# Patient Record
Sex: MALE
[De-identification: names, ages, dates, MRNs, and addresses within clinical notes are randomized per-mention and may not be internally consistent; named-entity substitution may affect disease eponyms.]

---

## 2019-01-09 PROBLEM — Z00.00 ENCOUNTER FOR PREVENTIVE HEALTH EXAMINATION: Status: ACTIVE | Noted: 2019-01-09

## 2019-01-10 ENCOUNTER — FORM ENCOUNTER (OUTPATIENT)
Age: 29
End: 2019-01-10

## 2019-01-11 ENCOUNTER — APPOINTMENT (OUTPATIENT)
Dept: ORTHOPEDIC SURGERY | Facility: CLINIC | Age: 29
End: 2019-01-11
Payer: COMMERCIAL

## 2019-01-11 ENCOUNTER — APPOINTMENT (OUTPATIENT)
Dept: RADIOLOGY | Facility: CLINIC | Age: 29
End: 2019-01-11

## 2019-01-11 ENCOUNTER — OUTPATIENT (OUTPATIENT)
Dept: OUTPATIENT SERVICES | Facility: HOSPITAL | Age: 29
LOS: 1 days | End: 2019-01-11
Payer: COMMERCIAL

## 2019-01-11 VITALS — WEIGHT: 160 LBS | BODY MASS INDEX: 22.9 KG/M2 | HEIGHT: 70 IN | RESPIRATION RATE: 16 BRPM

## 2019-01-11 DIAGNOSIS — S76.311A STRAIN OF MUSCLE, FASCIA AND TENDON OF THE POSTERIOR MUSCLE GROUP AT THIGH LEVEL, RIGHT THIGH, INITIAL ENCOUNTER: ICD-10-CM

## 2019-01-11 PROCEDURE — 72190 X-RAY EXAM OF PELVIS: CPT | Mod: 26

## 2019-01-11 PROCEDURE — 73522 X-RAY EXAM HIPS BI 3-4 VIEWS: CPT

## 2019-01-11 PROCEDURE — 72190 X-RAY EXAM OF PELVIS: CPT

## 2019-01-11 PROCEDURE — 99204 OFFICE O/P NEW MOD 45 MIN: CPT

## 2019-01-11 NOTE — HISTORY OF PRESENT ILLNESS
[de-identified] : Referring Provider: Ins provider\par Chief Complaint: right hamstring pain\par Date of Injury/onset: 6-15-18, after running half marathon\par \par Summary of symptoms: right proximal hamstring pain\par Severity of Pain:  3/10\par Character of Pain: localized and achy at the right hamstring origin, occasionally shooting pain down lateral aspect of thigh.\par What and when makes pain worse: running , bending, prolonged sitting\par Associated symptoms: none\par Alleviating factors: NSAIDS, rest and foam roller\par \par Mr. TARA ELIZONDO is a very pleasant 28 year male who presents today for evaluation of right hamstring pain. He states that in June 2018 after running a half marathon he felt a locking sensation in his right IT band and hamstrings and had difficulty walking due to pain afterwards. Since then he has had continuous achy pain at the right proximal hamstring with any exercise including running or basketball. Patient plays basketball once per week and states the pain sets in a few hours after basketball. He has tried foam rolling at home and utilizes a lacrosse ball as well and has also done self directed physical therapy for several months. This has not provided him any relief and the pain is worsening, and he has not been able to run more than a mile for several months now. He denies previous history of injury.

## 2019-01-11 NOTE — PHYSICAL EXAM
[de-identified] : General: Patient is an athletic 28 year old male. He is awake and alert, demonstrates appropriate mood and affect, exhibits normal breathing and is in no acute distress.\par Psych: The patient is appropriately dressed and groomed, maintains good eye contact. Alert and oriented x 3. Normal attention/concentration, fund of knowledge and recall. Normal speech rate and rhythm. No hallucinations, suicidal or homicidal ideations. Demonstrates expected level of insight and judgment regarding health.\par Skin: The patient has no chronic skin lesions, rashes, or ulcers. There is no induration or erythema of uninvolved extremities. For skin exam of involved extremity refer to detailed musculoskeletal/extremity exam. \par Lymph: No cervical, axillary, or popliteal lymphadenopathy. There is no swelling or lymphedema in uninvolved extremities, refer to detailed exam for involved limbs.\par Cardiovascular: No visible jugular venous distention. Normal point of maximal impulse without thrill. There is brisk capillary refill in the digits of the affected extremity. They are symmetric pulses in the bilateral upper and lower extremities. \par Respiratory: The patient is in no apparent respiratory distress. They're taking full deep breaths wirh normal excursion, without use of accessory muscles or evidence of audible wheezes or stridor without the use of a stethoscope. \par Neurological: 5/5 motor strength and sensation intact throughout uninvolved upper and loweer extremities, refer to detailed musculoskeletal exam regarding involved extremity.\par Neck: Full range of motion with flexon, extention, rotation, and side bending; no palpable crepitus, normal alignment and lordosis, symmetric appearance, midline trachea, no thyroid hypertrophy or nodules\par Musculoskeletal: [normal gait]. good posture. normal clinical alignment of the spine. full range of motion in [bilateral] upper and [bilateral] lower extremities.\par \par [Right] Hip exam:\par Inspection:  Skin exam, Evaluation of Trendellenberg sign and standing pelvic obliquity, evaluation for antalgic or Trendellenberg gait\par Palpation:  Evaluation for tenderness at the pubic symphysis, in the inguinal crease, along the psoas tendon, at the abductor tendon insertion and trochanteric bursa, posteriorly along the external rotators, along the ischial tuberosity and at the SI joint\par Strength testing:  Hip flexion, abduction, extention and adduction with specific assesment for pain with resisted hip flexion\par Special tests:  JANETH, FADIR, Char, Impingement test, Stinchfield\par Concomitant L-spine exam performed with paraspinal, central, and SI joint palpation.  Evaluation of lumbar lordosis, seated and supine straight leg raise, slump test, sensory exam, and strength testing L2-S1\par \par Siginificant positive findings were as follows, with all other findings within normal limits:\par Range of Motion: Range of motion within normal limits and symmetric bilaterally\par Focal examination of the right hamstrings demonstrates tenderness to palpation of origin of biceps femoris and palpation here demonstrates a less robust tendon as compared to the contralateral side, however tendon is intact to palpation. No visible defect noted as compared to contralateral side. Trace weakness with resisted knee flexion on the right side as compared to the left side. Sensation intact in all distributions. Patient has full pain free range of motion of the right hip as compared to the left hip. [de-identified] : X-rays:\par AP and multiple frog-leg lateral views of bilateral hips were obtained and reviewed. No significant anomalies noted at the ischial tuberosity. Incidental finding of herniation present the anterior femoral neck and the left hip. Some findings consistent with possible sub-spine impingement

## 2019-01-11 NOTE — DISCUSSION/SUMMARY
[de-identified] : Based on today's visit the patient has been diagnosed with partial tear at the hamstring origin, likely biceps femoris.  Patient has attempted appropriate initial conservative treatment without improvement in his symptoms. He has findings consistent with a partial hamstring origin tear which requires relatively timely evaluation and treatment. Based on the length of symptoms and severity as well as his limitations, I would like to obtain an MRI to evaluate the extent of the tear. Additional Physical therapy will be deferred until MRI is obtained. We also discussed other options including corticosteroid injection, PRP injection, and arthroscopy, however we will wait for MRI to proceed.\par \par The patient's history, physical exam and any relevant studies were reviewed with the patient at length. We reviewed relevant additional diagnostic studies and treatment options including no intervention, activity modification techniques, available pharmaceuticals, therapy, durable medical equipment/bracing, and surgical interventions if applicable. The risks and benefits of all treatments were reviewed, including the potential morbidity of untreated pathology.\par \par Following discussion and shared decision-making, the patient has elected to proceed with conservative management.\par \par Planned Interventions: activity modification, NSAIDs\par \par Referrals: none\par \par Additional Studies: right hamstring MRI\par \par Followup: 6 weeks as needed for reevaluation, or earlier if symptoms worsen or fail to improve\par \par X-rays at followup: [None]

## 2019-03-05 ENCOUNTER — FORM ENCOUNTER (OUTPATIENT)
Age: 29
End: 2019-03-05

## 2019-03-06 ENCOUNTER — OUTPATIENT (OUTPATIENT)
Dept: OUTPATIENT SERVICES | Facility: HOSPITAL | Age: 29
LOS: 1 days | End: 2019-03-06

## 2019-03-06 ENCOUNTER — APPOINTMENT (OUTPATIENT)
Dept: MRI IMAGING | Facility: CLINIC | Age: 29
End: 2019-03-06
Payer: COMMERCIAL

## 2019-03-06 PROCEDURE — 72195 MRI PELVIS W/O DYE: CPT | Mod: 26

## 2019-03-11 PROBLEM — M76.899 HAMSTRING TENDINITIS AT ORIGIN: Status: ACTIVE | Noted: 2019-03-11

## 2019-03-18 ENCOUNTER — APPOINTMENT (OUTPATIENT)
Dept: ORTHOPEDIC SURGERY | Facility: CLINIC | Age: 29
End: 2019-03-18

## 2019-03-18 DIAGNOSIS — M76.899 OTHER SPECIFIED ENTHESOPATHIES OF UNSPECIFIED LOWER LIMB, EXCLUDING FOOT: ICD-10-CM

## 2022-11-29 ENCOUNTER — APPOINTMENT (OUTPATIENT)
Dept: ORTHOPEDIC SURGERY | Facility: CLINIC | Age: 32
End: 2022-11-29